# Patient Record
Sex: FEMALE | Race: OTHER | HISPANIC OR LATINO | URBAN - METROPOLITAN AREA
[De-identification: names, ages, dates, MRNs, and addresses within clinical notes are randomized per-mention and may not be internally consistent; named-entity substitution may affect disease eponyms.]

---

## 2017-04-02 ENCOUNTER — EMERGENCY (EMERGENCY)
Facility: HOSPITAL | Age: 22
LOS: 1 days | Discharge: HOME | End: 2017-04-02
Attending: EMERGENCY MEDICINE | Admitting: EMERGENCY MEDICINE
Payer: COMMERCIAL

## 2017-04-02 VITALS
WEIGHT: 113.1 LBS | HEART RATE: 96 BPM | OXYGEN SATURATION: 97 % | SYSTOLIC BLOOD PRESSURE: 112 MMHG | TEMPERATURE: 98 F | HEIGHT: 61 IN | DIASTOLIC BLOOD PRESSURE: 78 MMHG | RESPIRATION RATE: 18 BRPM

## 2017-04-02 DIAGNOSIS — R51 HEADACHE: ICD-10-CM

## 2017-04-02 DIAGNOSIS — J02.9 ACUTE PHARYNGITIS, UNSPECIFIED: ICD-10-CM

## 2017-04-02 DIAGNOSIS — R05 COUGH: ICD-10-CM

## 2017-04-02 DIAGNOSIS — R53.83 OTHER FATIGUE: ICD-10-CM

## 2017-04-02 PROCEDURE — 99283 EMERGENCY DEPT VISIT LOW MDM: CPT

## 2017-04-02 RX ORDER — IBUPROFEN 200 MG
600 TABLET ORAL ONCE
Qty: 0 | Refills: 0 | Status: COMPLETED | OUTPATIENT
Start: 2017-04-02 | End: 2017-04-02

## 2017-04-02 RX ADMIN — Medication 600 MILLIGRAM(S): at 15:19

## 2017-04-02 NOTE — ED PROVIDER NOTE - OBJECTIVE STATEMENT
pt states she woke with sore throat today, skipped work as , and says she really needs a work note.  Mild fatigue since yesterday.  No fever/chills.  Mild nonproductive cough.  No swollen glands.  No difficulty breathing.  She is able to swallow food and liquid, but it is painful.

## 2017-04-02 NOTE — ED PROVIDER NOTE - NS ED ROS FT
CONSTITUTIONAL: No fever, chills, or weakness  NEURO: Mild headache; states gets headaches frequently since starting her job as a  due to irregular sleep patterns.  No dizziness, no syncope; No weakness/tingling/numbness  EYES: No visual changes  ENT: No rhinorrhea/nasal congestion;  no earache/otorrhea.  PULM: Mild dry cough.  No dyspnea or hemoptysis.  CV: No chest pain or palpitations  GI: No abdominal pain, vomiting, or diarrhea  : No dysuria, hematuria, frequency  MSK: No neck pain or back pain, no joint pain  SKIN: no rash

## 2017-04-02 NOTE — ED PROVIDER NOTE - PHYSICAL EXAMINATION
CONSTITUTIONAL: WD,WN. NAD.    SKIN: Normal color and turgor. No rash.    EYES: Conjunctiva clear. EOMI. PERRL.    ENT: Nasal mucosa clear, no rhinorrhea.  Pharynx slightly red; no edema/exudate, uvula is midline and without edema.  TM clear bilat.  No cervical MARY ANN.  RESPIRATORY:  Breathing non-labored. No retractions, accessory muscle use.  Lungs CTA bilaterally.    CARDIOVASCULAR:  RRR, S1S2. No M/R/G.      GI:    Abdomen soft, nontender.    MSK:  Neck supple with FROM. No lower extremity edema or calf tenderness.    NEURO: Alert and oriented; no gross abnl.

## 2017-04-02 NOTE — ED ADULT NURSE NOTE - OBJECTIVE STATEMENT
pt has a sore throat with difficulty swallowing and nausea and feeling fatiqued,denies fever or cough
